# Patient Record
Sex: FEMALE | Race: WHITE | NOT HISPANIC OR LATINO | Employment: FULL TIME | URBAN - METROPOLITAN AREA
[De-identification: names, ages, dates, MRNs, and addresses within clinical notes are randomized per-mention and may not be internally consistent; named-entity substitution may affect disease eponyms.]

---

## 2021-02-17 ENCOUNTER — OFFICE VISIT (OUTPATIENT)
Dept: OPHTHALMOLOGY | Facility: CLINIC | Age: 41
End: 2021-02-17
Payer: COMMERCIAL

## 2021-02-17 DIAGNOSIS — H52.13 MYOPIA OF BOTH EYES: Primary | ICD-10-CM

## 2021-02-17 PROCEDURE — V2520 CONTACT LENS HYDROPHILIC: HCPCS | Performed by: OPTOMETRIST

## 2021-02-17 NOTE — PROGRESS NOTES
No office visit. CL supply order only.    Contact Lens Billing  V-Code:  - Soft spherical     # of units: 2  Price per Unit: $53.15  Total: $106.3     [place] [facility] [place]  [place]: [id number]      These are for cosmetic contact lenses.    Encounter Diagnosis   Name Primary?     Myopia of both eyes Yes        Date of last eye exam: 01/02/2021.    NORBERT Wagner COT 7:46 AM February 17, 2021

## 2021-05-10 ENCOUNTER — OFFICE VISIT (OUTPATIENT)
Dept: OPHTHALMOLOGY | Facility: CLINIC | Age: 41
End: 2021-05-10
Payer: COMMERCIAL

## 2021-05-10 DIAGNOSIS — H52.13 MYOPIA OF BOTH EYES: Primary | ICD-10-CM

## 2021-05-10 PROCEDURE — V2520 CONTACT LENS HYDROPHILIC: HCPCS | Performed by: OPTOMETRIST

## 2021-05-10 NOTE — PROGRESS NOTES
No office visit. CL Order only.    Contact Lens Billing  V-Code:  - Soft spherical     # of units: 4  Price per Unit: $49.64  Total 198.56  Pt  in clinic    These are for cosmetic contact lenses.    Encounter Diagnosis   Name Primary?     Myopia of both eyes Yes      1-day Acuvue Moist -5.75 BC 9.0Dia 14.2 each eye     Date of last eye exam: 1/2/2021    Babita Dunbar, NORBERT COT 2:13 PM May 10, 2021

## 2021-06-20 ENCOUNTER — HEALTH MAINTENANCE LETTER (OUTPATIENT)
Age: 41
End: 2021-06-20

## 2021-10-11 ENCOUNTER — HEALTH MAINTENANCE LETTER (OUTPATIENT)
Age: 41
End: 2021-10-11

## 2021-11-23 ENCOUNTER — OFFICE VISIT (OUTPATIENT)
Dept: OPHTHALMOLOGY | Facility: CLINIC | Age: 41
End: 2021-11-23
Payer: COMMERCIAL

## 2021-11-23 DIAGNOSIS — H52.13 MYOPIA OF BOTH EYES: Primary | ICD-10-CM

## 2021-11-23 PROCEDURE — V2520 CONTACT LENS HYDROPHILIC: HCPCS | Performed by: OPTOMETRIST

## 2021-11-23 NOTE — PROGRESS NOTES
No office visit. CL order only.    Contact Lens Billing  V-Code:  - Soft spherical     # of units: 8  Price per Unit: $49.64  Total: $397.12    Pt  in clinic.    These are for cosmetic contact lenses.    Encounter Diagnosis   Name Primary?     Myopia of both eyes Yes        1-day Acuvue Moist -5.75 BC 9.0Dia 14.2 each eye     Date of last eye exam: 1/2/2021    NORBERT Wagner COT 12:10 PM November 23, 2021

## 2022-05-23 ENCOUNTER — LAB REQUISITION (OUTPATIENT)
Dept: LAB | Facility: CLINIC | Age: 42
End: 2022-05-23

## 2022-05-23 PROCEDURE — U0005 INFEC AGEN DETEC AMPLI PROBE: HCPCS | Performed by: INTERNAL MEDICINE

## 2022-05-24 LAB — SARS-COV-2 RNA RESP QL NAA+PROBE: NEGATIVE

## 2022-07-17 ENCOUNTER — HEALTH MAINTENANCE LETTER (OUTPATIENT)
Age: 42
End: 2022-07-17

## 2022-09-25 ENCOUNTER — HEALTH MAINTENANCE LETTER (OUTPATIENT)
Age: 42
End: 2022-09-25

## 2022-12-27 ENCOUNTER — LAB REQUISITION (OUTPATIENT)
Dept: LAB | Facility: CLINIC | Age: 42
End: 2022-12-27

## 2022-12-27 LAB — SARS-COV-2 RNA RESP QL NAA+PROBE: NEGATIVE

## 2022-12-27 PROCEDURE — U0005 INFEC AGEN DETEC AMPLI PROBE: HCPCS | Performed by: INTERNAL MEDICINE

## 2023-01-12 ENCOUNTER — TELEPHONE (OUTPATIENT)
Dept: OPTOMETRY | Facility: CLINIC | Age: 43
End: 2023-01-12

## 2023-01-12 NOTE — TELEPHONE ENCOUNTER
7 boxes of 8.5bc returned. Ordered 7 boxes of 9.0bc/-5.75.     [place] [facility] [id number]

## 2023-07-10 ENCOUNTER — OFFICE VISIT (OUTPATIENT)
Dept: OPTOMETRY | Facility: CLINIC | Age: 43
End: 2023-07-10
Payer: COMMERCIAL

## 2023-07-10 DIAGNOSIS — H52.13 MYOPIA OF BOTH EYES: Primary | ICD-10-CM

## 2023-07-10 ASSESSMENT — REFRACTION_CURRENTRX
OD_DIAMETER: 14.2
OS_DIAMETER: 14.2
OS_BRAND: AV 1 DAY MOIST
OS_BASECURVE: 9.0
OD_BASECURVE: 9.0
OS_SPHERE: -5.75
OD_BRAND: AV 1 DAY MOIST
OD_SPHERE: -5.75

## 2023-07-10 NOTE — PROGRESS NOTES
No office visit. CL order only.    Pt is attending MD - Rx okay to fill    Contact Lens Billing  V-Code:  - Soft spherical  Final Contact Lens Rx       Brand Base Curve Diameter Sphere    Right AV 1 Day Moist 9.0 14.2 -5.75    Left AV 1 Day Moist 9.0 14.2 -5.75         Fait order mailed to clinic - will give to her when it arrives: [id number]  # of units: 8 boxes (90-packs)  Price per Unit: $50.75 per box + $7.95 shipping = $413.95 total    These are for cosmetic contact lenses.    Encounter Diagnosis   Name Primary?     Myopia of both eyes Yes        Date of last eye exam: N/A

## 2023-08-08 ENCOUNTER — OFFICE VISIT (OUTPATIENT)
Dept: OPTOMETRY | Facility: CLINIC | Age: 43
End: 2023-08-08
Payer: COMMERCIAL

## 2023-08-08 DIAGNOSIS — H52.13 MYOPIA OF BOTH EYES: Primary | ICD-10-CM

## 2023-08-09 NOTE — PROGRESS NOTES
No office visit. CL order only. Give to Dr. REVELES when arrives    Contact Lens Billing  V-Code:  - Soft spherical  Final Contact Lens Rx         Brand Base Curve Diameter Sphere Lens    Right Acuvue 1 Day Moist 8.5 14.2 -1.25 Ed REVELES    Left Acuvue 1 Day Moist 8.5 14.2 -1.50 Ed REVELES           [facility] Order #[id number]    # of units: 8 90-packs  Price per Unit: $68 per 90 pack = $544 total  Free shipping annual supply    These are for cosmetic contact lenses.    Encounter Diagnosis   Name Primary?    Myopia of both eyes Yes        Date of last eye exam: n/a - Rx'd by Dr. Gomez

## 2024-03-02 ENCOUNTER — HEALTH MAINTENANCE LETTER (OUTPATIENT)
Age: 44
End: 2024-03-02

## 2024-07-26 ENCOUNTER — OFFICE VISIT (OUTPATIENT)
Dept: OPHTHALMOLOGY | Facility: CLINIC | Age: 44
End: 2024-07-26
Payer: COMMERCIAL

## 2024-07-26 DIAGNOSIS — H52.13 MYOPIA OF BOTH EYES: Primary | ICD-10-CM

## 2024-07-26 PROCEDURE — V2520 CONTACT LENS HYDROPHILIC: HCPCS | Performed by: OPTOMETRIST

## 2024-07-26 ASSESSMENT — REFRACTION_CURRENTRX
OD_BRAND: AV 1 DAY MOIST
OD_DIAMETER: 14.2
OS_BRAND: AV 1 DAY MOIST
OS_BASECURVE: 9.0
OS_DIAMETER: 14.2
OD_SPHERE: -5.75
OD_BASECURVE: 9.0
OS_SPHERE: -5.75

## 2024-07-26 NOTE — PROGRESS NOTES
No office visit. CL order only.    Pt is attending MD - Rx okay to fill    Contact Lens Billing  V-Code:  - Soft spherical  Final Contact Lens Rx         Brand Base Curve Diameter Sphere    Right AV 1 Day Moist 9.0 14.2 -5.75    Left AV 1 Day Moist 9.0 14.2 -5.75           Fait order mailed to clinic - will give to her when it arrives: [id number]  # of units: 8 boxes (90-packs)  Price per Unit: $52.28 per box + $7.95 shipping = $426.19 total    These are for cosmetic contact lenses.    Encounter Diagnosis   Name Primary?    Myopia of both eyes Yes        Date of last eye exam: N/A

## 2024-07-31 ENCOUNTER — OFFICE VISIT (OUTPATIENT)
Dept: OPHTHALMOLOGY | Facility: CLINIC | Age: 44
End: 2024-07-31
Payer: COMMERCIAL

## 2024-07-31 DIAGNOSIS — H52.13 MYOPIA OF BOTH EYES: Primary | ICD-10-CM

## 2024-07-31 PROCEDURE — V2520 CONTACT LENS HYDROPHILIC: HCPCS | Performed by: OPTOMETRIST

## 2024-07-31 ASSESSMENT — REFRACTION_CURRENTRX
OS_SPHERE: -1.50
OD_SPHERE: -1.25
OD_DIAMETER: 14.2
OS_DIAMETER: 14.2
OD_BASECURVE: 8.5
OS_BASECURVE: 8.5
OS_BRAND: ACUVUE 1 DAY MOIST
OD_BRAND: ACUVUE 1 DAY MOIST

## 2024-07-31 NOTE — PROGRESS NOTES
No office visit. CL order only. Give to Dr. REVELES when arrives    Contact Lens Billing  V-Code:  - Soft spherical  Final Contact Lens Rx         Brand Base Curve Diameter Sphere Lens    Right Acuvue 1 Day Moist 8.5 14.2 -1.50 Ed REVELES    Left Acuvue 1 Day Moist 8.5 14.2 -1.50 Ed REVELES           [facility] Order #[id number]  # of units: 8 90-packs  Price per Unit: $75 per 90 pack = $600 total  Free shipping annual supply    These are for cosmetic contact lenses.    Encounter Diagnosis   Name Primary?    Myopia of both eyes Yes        Date of last eye exam: n/a - Rx'd by Dr. Gomez

## 2025-04-26 ENCOUNTER — HEALTH MAINTENANCE LETTER (OUTPATIENT)
Age: 45
End: 2025-04-26

## 2025-07-10 ENCOUNTER — OFFICE VISIT (OUTPATIENT)
Dept: OPHTHALMOLOGY | Facility: CLINIC | Age: 45
End: 2025-07-10
Payer: COMMERCIAL

## 2025-07-10 DIAGNOSIS — H52.13 MYOPIA OF BOTH EYES: Primary | ICD-10-CM

## 2025-07-10 ASSESSMENT — REFRACTION_CURRENTRX
OD_BASECURVE: 9.0
OD_SPHERE: -5.75
OS_BRAND: AV 1 DAY MOIST
OS_BASECURVE: 9.0
OD_DIAMETER: 14.2
OS_DIAMETER: 14.2
OD_BRAND: AV 1 DAY MOIST
OS_SPHERE: -5.75

## 2025-07-10 NOTE — PROGRESS NOTES
No office visit. CL order only.    Pt is attending MD - Rx okay to fill    Contact Lens Billing  V-Code:  - Soft spherical  Final Contact Lens Rx         Brand Base Curve Diameter Sphere Lens    Right AV 1 Day Moist 9.0 14.2 -5.75 Kateryna    Left AV 1 Day Moist 9.0 14.2 -5.75 Kateryna           Fait order mailed direct to home address: [id number]  # of units: 8 boxes (90-packs)  Price per Unit: $57.40 per box = $459.20 total    These are for cosmetic contact lenses.    Encounter Diagnosis   Name Primary?    Myopia of both eyes Yes        Date of last eye exam: N/A